# Patient Record
Sex: MALE | URBAN - METROPOLITAN AREA
[De-identification: names, ages, dates, MRNs, and addresses within clinical notes are randomized per-mention and may not be internally consistent; named-entity substitution may affect disease eponyms.]

---

## 2021-01-27 ENCOUNTER — TELEPHONE (OUTPATIENT)
Dept: TRANSPLANT | Facility: CLINIC | Age: 46
End: 2021-01-27

## 2021-01-27 NOTE — TELEPHONE ENCOUNTER
Patient call:  Mom Jeanie called this Am 01/27/2021  Is helping her son;  Terrence possibly get into the liver program here ([facility])  Mom stated there maybe a possible family donor  Terrence is currently admitted at [facility]  (was admitted 01/12/2021 found at his home unresponsive) Mom Jeanie stated she has Terrence's phone at her house not charged.  Jeanie is helping Terrence with possible listing here.  Stated Terrence has liver failure and kidney failure and was started on dialysis at [facility] recently.  Jeanie stated, 'This all new to them and she has many question to ask.'     Call back needed? Yes    Return Call Needed  Same as documented in contacts section  When to return call?: Same day: Route [facility] Priority

## 2021-01-28 NOTE — TELEPHONE ENCOUNTER
Spoke to mom, but unfortunately we can not access his [facility] records at this time.    Mom reports he is out of the ICU and improving. He is on HD now and some talk about TCU or rehab after he is discharged.    Plan: mom is going to call me (gave direct number) once he is discharged from [place] so we can set up with at least a consult to help get records and figure out game plan from there.    She agreed to this. She will also call if he takes a turn for the worse and/or ask the MDs to call [facility] at that time to enquire on if transfer is warranted.    Note - in chart there are historical notes from Debra Valentin NP who saw patient back in [id number] time frame.